# Patient Record
Sex: FEMALE | Race: WHITE
[De-identification: names, ages, dates, MRNs, and addresses within clinical notes are randomized per-mention and may not be internally consistent; named-entity substitution may affect disease eponyms.]

---

## 2017-02-23 ENCOUNTER — HOSPITAL ENCOUNTER (OUTPATIENT)
Dept: HOSPITAL 61 - PCVCIMAG | Age: 67
Discharge: HOME | End: 2017-02-23
Attending: INTERNAL MEDICINE
Payer: MEDICARE

## 2017-02-23 DIAGNOSIS — I63.8: Primary | ICD-10-CM

## 2017-02-23 DIAGNOSIS — K21.9: ICD-10-CM

## 2017-02-23 DIAGNOSIS — R07.9: ICD-10-CM

## 2017-02-23 DIAGNOSIS — R00.2: ICD-10-CM

## 2017-02-23 PROCEDURE — 93880 EXTRACRANIAL BILAT STUDY: CPT

## 2017-02-23 PROCEDURE — A9500 TC99M SESTAMIBI: HCPCS

## 2017-02-23 PROCEDURE — 78452 HT MUSCLE IMAGE SPECT MULT: CPT

## 2017-02-23 PROCEDURE — 93306 TTE W/DOPPLER COMPLETE: CPT

## 2017-02-23 PROCEDURE — 93017 CV STRESS TEST TRACING ONLY: CPT

## 2017-03-22 ENCOUNTER — HOSPITAL ENCOUNTER (OUTPATIENT)
Dept: HOSPITAL 61 - PCVCIMAG | Age: 67
Discharge: HOME | End: 2017-03-22
Attending: INTERNAL MEDICINE
Payer: MEDICARE

## 2017-03-22 DIAGNOSIS — K21.9: ICD-10-CM

## 2017-03-22 DIAGNOSIS — I63.9: ICD-10-CM

## 2017-03-22 DIAGNOSIS — E04.1: Primary | ICD-10-CM

## 2017-03-22 DIAGNOSIS — R07.9: ICD-10-CM

## 2017-03-22 PROCEDURE — 76536 US EXAM OF HEAD AND NECK: CPT

## 2018-08-28 ENCOUNTER — HOSPITAL ENCOUNTER (OUTPATIENT)
Dept: HOSPITAL 61 - PCVCCLINIC | Age: 68
Discharge: HOME | End: 2018-08-28
Attending: RADIOLOGY
Payer: MEDICARE

## 2018-08-28 DIAGNOSIS — K21.9: ICD-10-CM

## 2018-08-28 DIAGNOSIS — I77.9: ICD-10-CM

## 2018-08-28 DIAGNOSIS — E11.9: ICD-10-CM

## 2018-08-28 DIAGNOSIS — I10: ICD-10-CM

## 2018-08-28 DIAGNOSIS — R06.09: ICD-10-CM

## 2018-08-28 DIAGNOSIS — I73.9: Primary | ICD-10-CM

## 2018-08-28 DIAGNOSIS — F17.200: ICD-10-CM

## 2018-08-28 PROCEDURE — G0463 HOSPITAL OUTPT CLINIC VISIT: HCPCS

## 2018-12-05 ENCOUNTER — HOSPITAL ENCOUNTER (OUTPATIENT)
Dept: HOSPITAL 61 - PCVCIMAG | Age: 68
Discharge: HOME | End: 2018-12-05
Attending: RADIOLOGY
Payer: MEDICARE

## 2018-12-05 DIAGNOSIS — I77.9: ICD-10-CM

## 2018-12-05 DIAGNOSIS — I73.9: ICD-10-CM

## 2018-12-05 DIAGNOSIS — I65.23: Primary | ICD-10-CM

## 2018-12-05 DIAGNOSIS — E04.1: ICD-10-CM

## 2018-12-05 PROCEDURE — G0463 HOSPITAL OUTPT CLINIC VISIT: HCPCS

## 2018-12-05 PROCEDURE — 93926 LOWER EXTREMITY STUDY: CPT

## 2018-12-05 PROCEDURE — 76536 US EXAM OF HEAD AND NECK: CPT

## 2018-12-05 PROCEDURE — 93880 EXTRACRANIAL BILAT STUDY: CPT

## 2018-12-05 NOTE — PCVCIMAG
EXAM: RIGHT LOWER EXTREMITY ARTERIAL DUPLEX



INDICATION: Peripheral Arterial Disease. Leg pain.



FINDINGS: 

Right Leg: Common femoral and profunda femoral arteries are patent.

Superficial femoral artery and popliteal artery are patent. Previous

stent distal superficial femoral artery and upper popliteal artery

maintaining good patency. Unchanged occlusion peroneal artery and

posterior tibial artery. Anterior tibial artery remains patent.    



IMPRESSION: 

Previous right distal superficial femoral artery and upper popliteal

artery stent maintaining good patency.

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

arteries.

   



LOC:[id number]

## 2018-12-05 NOTE — PCVCIMAG
EXAM: BILATERAL CAROTID DUPLEX



INDICATION: Carotid Occlusive Disease.



FINDINGS: 

Doppler Measurements (centimeters per second):



RIGHT:  Peak CCA-78, Peak ECA-92, Diastolic ICA-33, Peak ICA-98,

ICA/CCA Ratio-1.3.



LEFT:  Peak CCA-80, Peak ECA-90, Diastolic ICA-30, Peak ICA-88,

ICA/CCA Ratio-1.1.



RIGHT CAROTID: The carotid bulb has mild plaque. The proximal internal

carotid artery shows <40% stenosis. The common carotid artery shows no

significant stenosis. The external carotid artery shows no significant

stenosis.



LEFT CAROTID:  The carotid bulb has mild plaque. The proximal internal

carotid artery shows <40% stenosis. The common carotid artery shows no

significant stenosis. The external carotid artery shows no significant

stenosis.



Antegrade flow in both vertebral arteries.



IMPRESSION: 

<40% stenosis of the right internal carotid artery with mild plaque.

<40% stenosis of the left internal carotid artery with mild plaque.



LOC:[id number]

## 2018-12-06 NOTE — PCVCIMAG
EXAM: ULTRASOUND OF THE THYROID



INDICATION: Thyroid nodules.



FINDINGS: 

The right thyroid lobe measures 4.7 x 1.8 x 1.8 cm.

The left thyroid lobe measures 4.8 x 1.6 x 1.5 cm. 

0.6 x 1.0 x 1.1 cm hypoechoic nodule mid right thyroid lobe is

unchanged compared to March 2017 study.

Previous hypoechoic nodule in the left submandibular region measures

0.9 cm in this was present previously and is smaller today.

There is an increased number of lymph nodes throughout the neck

ranging in size from 1/2 to 2 cm. This is a nonspecific finding but

could be related to a lymphoproliferative disorder or be reactive in

origin. Please correlate clinically.



There is a new 0.8 x 1.2 x 1.6 hypoechoic nodule lateral to the lower

pole of the thyroid gland which is external to the thyroid. This was

not seen previously and is indeterminate.



IMPRESSION: 

1. Unchanged 1.1 cm hypoechoic mid thyroid lobe nodule. 

2. Previous left submandibular region solid nodule has decreased in

size since 2017 study. 

3. There is a new 1.6 cm solid nodule lateral to the lower left

thyroid lobe which is outside of the thyroid gland. 

4. Increased number of mildly enlarged lymph nodes throughout the neck

bilaterally has developed since 2017 and raises concern for

lymphoproliferative disorder. Please correlate clinically. Further

evaluation by ENT may be of value.



LOC:[id number]